# Patient Record
Sex: FEMALE | Race: OTHER | Employment: UNEMPLOYED | URBAN - METROPOLITAN AREA
[De-identification: names, ages, dates, MRNs, and addresses within clinical notes are randomized per-mention and may not be internally consistent; named-entity substitution may affect disease eponyms.]

---

## 2017-07-12 ENCOUNTER — OFFICE VISIT (OUTPATIENT)
Dept: FAMILY MEDICINE CLINIC | Age: 22
End: 2017-07-12

## 2017-07-12 VITALS
OXYGEN SATURATION: 100 % | SYSTOLIC BLOOD PRESSURE: 120 MMHG | HEART RATE: 77 BPM | HEIGHT: 66 IN | RESPIRATION RATE: 16 BRPM | DIASTOLIC BLOOD PRESSURE: 80 MMHG | BODY MASS INDEX: 21.86 KG/M2 | WEIGHT: 136 LBS | TEMPERATURE: 98.2 F

## 2017-07-12 DIAGNOSIS — M54.9 ACUTE BACK PAIN, UNSPECIFIED BACK LOCATION, UNSPECIFIED BACK PAIN LATERALITY: Primary | ICD-10-CM

## 2017-07-12 NOTE — PROGRESS NOTES
HISTORY OF PRESENT ILLNESS  Barber Virk is a 25 y.o. female. Tailbone Pain   The history is provided by the patient. This is a new problem. Episode onset: New patient with tailbone pain. She had a fall down stairs in early June. She was seen in urgent care and was told she had a fracture. Since then, she continues to have episodic pain which has improved. Aggravated by working out. Relieved by rest. The problem occurs daily. The problem has been gradually improving. Review of Systems   Constitutional: Negative. Physical Exam   Constitutional: She appears well-developed and well-nourished. HENT:   Head: Normocephalic and atraumatic. Musculoskeletal: Normal range of motion. She exhibits tenderness. She exhibits no edema or deformity. ASSESSMENT and PLAN  Tailbone fracture: Based on symptoms, she is healing appropriately. Continue to progress activities. Can use NSAIDs as needed. F/u for new or worsening symptoms.

## 2017-07-12 NOTE — PROGRESS NOTES
Chief Complaint   Patient presents with    Tailbone Pain     \"pt stated that she fall down some stairs Bernice 3 2017\"     1. When and where did you last receive medical care? Yes Where: [facility]    2. When and where did you last have preventive care such as mammogram, pap smears or colon screening? no    3. What is your current living situation (for example, live alone, live in home with immediate family members)? Boyfriend mom    4. Do you have any problems with communication such trouble seeing, hearing, or understanding instructions? No    5. Do you have an advance directive? This is a document that you can give to family members with instructions for how you would want them to make health care decisions for you if you were unable to speak for yourself. (For example, unconscious, delerious)No    PMH/FH/Social Hx reviewed and updated as needed     Applicable screenings reviewed and updated as needed  Medication reconciliation performed. Patient does not need medication refills. Health Maintenance reviewed.

## 2017-07-12 NOTE — MR AVS SNAPSHOT
Visit Information Date & Time Provider Department Dept. Phone Encounter #  
 7/12/2017 10:30 AM Trudy Jensen MD [address] [place] [address] [id number] Upcoming Health Maintenance Date Due  
 HPV AGE 9Y-34Y (1 of 3 - Female 3 Dose Series) 7/4/2007 DTaP/Tdap/Td series (1 - Tdap) 7/4/2017 PAP AKA CERVICAL CYTOLOGY 7/4/2017 INFLUENZA AGE 9 TO ADULT 8/1/2017 Allergies as of 7/12/2017  Review Complete On: 7/12/2017 By: Anne Bernal No Known Allergies Current Immunizations  Never Reviewed No immunizations on file. Not reviewed this visit Vitals BP Pulse Temp Resp Height(growth percentile) Weight(growth percentile) 120/80 (BP 1 Location: Left arm, BP Patient Position: Sitting) 77 98.2 °F (36.8 °C) (Oral) 16 5' 6\" (1.676 m) 136 lb (61.7 kg) LMP SpO2 BMI Smoking Status 06/28/2017 (Approximate) 100% 21.95 kg/m2 Never Smoker Vitals History BMI and BSA Data Body Mass Index Body Surface Area  
 21.95 kg/m 2 1.7 m 2 Preferred Pharmacy Pharmacy Name Phone [facility] [address] 39 Your Updated Medication List  
  
Notice  As of 7/12/2017 11:38 AM  
 You have not been prescribed any medications. Introducing [facility] & HEALTH SERVICES! Lenora Aguilar introduces PerBlue patient portal. Now you can access parts of your medical record, email your doctor's office, and request medication refills online. 1. In your internet browser, go to [email or web address] 2. Click on the First Time User? Click Here link in the Sign In box. You will see the New Member Sign Up page. 3. Enter your PerBlue Access Code exactly as it appears below. You will not need to use this code after youve completed the sign-up process. If you do not sign up before the expiration date, you must request a new code.  
 
· PerBlue Access Code: [email or web address] 
 Expires: 10/10/2017 11:38 AM 
 
4. Enter the last four digits of your Social Security Number (xxxx) and Date of Birth (mm/dd/yyyy) as indicated and click Submit. You will be taken to the next sign-up page. 5. Create a Wisembly ID. This will be your Wisembly login ID and cannot be changed, so think of one that is secure and easy to remember. 6. Create a Wisembly password. You can change your password at any time. 7. Enter your Password Reset Question and Answer. This can be used at a later time if you forget your password. 8. Enter your e-mail address. You will receive e-mail notification when new information is available in [address] 9. Click Sign Up. You can now view and download portions of your medical record. 10. Click the Download Summary menu link to download a portable copy of your medical information. If you have questions, please visit the Frequently Asked Questions section of the Wisembly website. Remember, Wisembly is NOT to be used for urgent needs. For medical emergencies, dial 911. Now available from your iPhone and Android! Please provide this summary of care documentation to your next provider. If you have any questions after today's visit, please call [phone].

## 2017-08-22 ENCOUNTER — OFFICE VISIT (OUTPATIENT)
Dept: FAMILY MEDICINE CLINIC | Age: 22
End: 2017-08-22

## 2017-08-22 VITALS
SYSTOLIC BLOOD PRESSURE: 130 MMHG | OXYGEN SATURATION: 100 % | HEART RATE: 65 BPM | RESPIRATION RATE: 16 BRPM | HEIGHT: 66 IN | DIASTOLIC BLOOD PRESSURE: 86 MMHG | WEIGHT: 135 LBS | BODY MASS INDEX: 21.69 KG/M2 | TEMPERATURE: 98.6 F

## 2017-08-22 DIAGNOSIS — M53.3 COCCYX PAIN: Primary | ICD-10-CM

## 2017-08-22 NOTE — PROGRESS NOTES
HISTORY OF PRESENT ILLNESS  Nehemias Newman is a 25 y.o. female. Tailbone Pain   The history is provided by the patient. This is a chronic problem. Episode onset: Presents for f/u of tailbone pain. She had a fall down stairs in early June. She was seen at [facility] and told she had a fx. Her symptoms were slowly resolving when she was seen in July. However, her symptoms have worsened recently. Episode frequency: No new fall. No new symptoms only exacerbation of existing symptoms following exercise. The problem has been gradually worsening. Exacerbated by: Exercise. Pain localizes to coccyx and raddiates over buttocks. Nothing relieves the symptoms. She has tried nothing for the symptoms. Review of Systems   Constitutional: Negative. Physical Exam   Constitutional: She appears well-developed and well-nourished. Genitourinary:   Genitourinary Comments: Exam deferred due to patient discomfort       ASSESSMENT and PLAN  Coccyx pain: Recheck x-ray with further evaluation/treatment based on that result.

## 2017-08-22 NOTE — PROGRESS NOTES
Discharge instructions reviewed with patient. Patient directed to complete x-ray. APA information reviewed and given to patient. Barriers to adherence assessed. Well Woman  Exam scheduled. AVS printed, reviewed and given to patient.

## 2017-08-22 NOTE — MR AVS SNAPSHOT
Visit Information Date & Time Provider Department Dept. Phone Encounter #  
 8/22/2017 11:00 AM Prerna Pierson MD [facility] [address] [id number] Your Appointments 9/12/2017  2:45 PM  
WELL WOMAN EXAM with Prerna Pierson MD  
[facility] [address] ([address]) Appt Note: APPT : Well Woman Exam  
 [phone] Upcoming Health Maintenance Date Due  
 HPV AGE 9Y-34Y (1 of 3 - Female 3 Dose Series) 7/4/2007 DTaP/Tdap/Td series (1 - Tdap) 7/4/2017 PAP AKA CERVICAL CYTOLOGY 7/4/2017 INFLUENZA AGE 9 TO ADULT 8/1/2017 Allergies as of 8/22/2017  Review Complete On: 8/22/2017 By: Prerna Pierson MD  
 No Known Allergies Current Immunizations  Never Reviewed No immunizations on file. Not reviewed this visit You Were Diagnosed With   
  
 Codes Comments Coccyx pain    -  Primary ICD-10-CM: M53.3 ICD-9-CM: 724.79 Vitals BP Pulse Temp Resp Height(growth percentile) Weight(growth percentile) 130/86 (BP 1 Location: Left arm, BP Patient Position: Sitting) 65 98.6 °F (37 °C) (Oral) 16 5' 6\" (1.676 m) 135 lb (61.2 kg) LMP SpO2 BMI OB Status Smoking Status 08/22/2017 (Exact Date) 100% 21.79 kg/m2 Having regular periods Never Smoker Vitals History BMI and BSA Data Body Mass Index Body Surface Area 21.79 kg/m 2 1.69 m 2 Preferred Pharmacy Pharmacy Name Phone [facility] [address] [place] [address] 39 Your Updated Medication List  
  
Notice  As of 8/22/2017 11:40 AM  
 You have not been prescribed any medications. To-Do List   
 08/23/2017 Imaging:  XR SACRUM AND COCCYX Patient Instructions Report for  X-ray to any [address]. Gertrudis Marcial .Call Advanced Patient Advocacy at [phone].  They will screen you for any insurance you might qualify for. This is the first step before you can receive discounts at the hospital or [facility] specialists. Additional contact numbers for APA are below: For [place] patients: [phone] For [place] patients: [phone] For [place]/[facility] patients: [phone] Introducing [facility] & HEALTH SERVICES! [facility] introduces WittyParrot patient portal. Now you can access parts of your medical record, email your doctor's office, and request medication refills online. 1. In your internet browser, go to [email or web address] 2. Click on the First Time User? Click Here link in the Sign In box. You will see the New Member Sign Up page. 3. Enter your WittyParrot Access Code exactly as it appears below. You will not need to use this code after youve completed the sign-up process. If you do not sign up before the expiration date, you must request a new code. · WittyParrot Access Code: [email or web address] Expires: 10/10/2017 11:38 AM 
 
4. Enter the last four digits of your Social Security Number (xxxx) and Date of Birth (mm/dd/yyyy) as indicated and click Submit. You will be taken to the next sign-up page. 5. Create a WittyParrot ID. This will be your WittyParrot login ID and cannot be changed, so think of one that is secure and easy to remember. 6. Create a WittyParrot password. You can change your password at any time. 7. Enter your Password Reset Question and Answer. This can be used at a later time if you forget your password. 8. Enter your e-mail address. You will receive e-mail notification when new information is available in [address] 9. Click Sign Up. You can now view and download portions of your medical record. 10. Click the Download Summary menu link to download a portable copy of your medical information.  
 
If you have questions, please visit the Frequently Asked Questions section of the Localler. Remember, illuminate Solutionshart is NOT to be used for urgent needs. For medical emergencies, dial 911. Now available from your iPhone and Android! Please provide this summary of care documentation to your next provider. If you have any questions after today's visit, please call [phone].

## 2017-08-22 NOTE — PATIENT INSTRUCTIONS
Report for  X-ray to any [facility] [facility] [address]. Eura Benjamin .Call Advanced Patient Advocacy at [phone]. They will screen you for any insurance you might qualify for. This is the first step before you can receive discounts at the hospital or [facility] specialists. Additional contact numbers for APA are below:   For [place]/[facility] patients: [phone]  For [place]/[facility] patients: [phone]  For [place]/[facility] patients: [phone]

## 2017-09-12 ENCOUNTER — HOSPITAL ENCOUNTER (OUTPATIENT)
Dept: LAB | Age: 22
Discharge: HOME | End: 2017-09-12

## 2017-09-12 ENCOUNTER — OFFICE VISIT (OUTPATIENT)
Dept: FAMILY MEDICINE CLINIC | Age: 22
End: 2017-09-12

## 2017-09-12 VITALS
OXYGEN SATURATION: 100 % | RESPIRATION RATE: 16 BRPM | SYSTOLIC BLOOD PRESSURE: 118 MMHG | DIASTOLIC BLOOD PRESSURE: 79 MMHG | WEIGHT: 132 LBS | BODY MASS INDEX: 21.21 KG/M2 | HEIGHT: 66 IN | TEMPERATURE: 98.5 F | HEART RATE: 74 BPM

## 2017-09-12 DIAGNOSIS — Z12.4 SCREENING FOR CERVICAL CANCER: Primary | ICD-10-CM

## 2017-09-12 PROCEDURE — 88142 CYTOPATH C/V THIN LAYER: CPT | Performed by: FAMILY MEDICINE

## 2017-09-12 NOTE — MR AVS SNAPSHOT
Visit Information Date & Time Provider Department Dept. Phone Encounter #  
 9/12/2017  2:45 PM Carey Dietrich MD [facility] [address] [id number] Your Appointments 9/12/2017  2:45 PM  
WELL WOMAN EXAM with Carey Dietrich MD  
[facility] [address]) Appt Note: APPT : Well Woman Exam  
 [address] [facility]  
  
   
 [address] [phone] Upcoming Health Maintenance Date Due  
 HPV AGE 9Y-34Y (1 of 3 - Female 3 Dose Series) 7/4/2007 DTaP/Tdap/Td series (1 - Tdap) 7/4/2017 PAP AKA CERVICAL CYTOLOGY 7/4/2017 INFLUENZA AGE 9 TO ADULT 8/1/2017 Allergies as of 9/12/2017  Review Complete On: 9/12/2017 By: Carey Dietrich MD  
 No Known Allergies Current Immunizations  Never Reviewed No immunizations on file. Not reviewed this visit You Were Diagnosed With   
  
 Codes Comments Screening for cervical cancer    -  Primary ICD-10-CM: Z12.4 ICD-9-CM: V76.2 Vitals BP Pulse Temp Resp Height(growth percentile) Weight(growth percentile) 118/79 (BP 1 Location: Left arm, BP Patient Position: Sitting) 74 98.5 °F (36.9 °C) (Oral) 16 5' 6\" (1.676 m) 132 lb (59.9 kg) LMP SpO2 BMI OB Status Smoking Status 08/22/2017 (Exact Date) 100% 21.31 kg/m2 Having regular periods Never Smoker BMI and BSA Data Body Mass Index Body Surface Area  
 21.31 kg/m 2 1.67 m 2 Preferred Pharmacy Pharmacy Name Phone [facility] [address] 39 Your Updated Medication List  
  
   
This list is accurate as of: 9/12/17  1:41 PM.  Always use your most recent med list.  
  
  
  
  
 PNV 29-1 PO Take  by mouth. To-Do List   
 09/13/2017 Pathology:  PAP, LB, RFX HPV CAEFT([id number]) Introducing \[facility]\"" & HEALTH SERVICES! Taz Prieto introduces FiPath patient portal. Now you can access parts of your medical record, email your doctor's office, and request medication refills online. 1. In your internet browser, go to [email or web address] 2. Click on the First Time User? Click Here link in the Sign In box. You will see the New Member Sign Up page. 3. Enter your FiPath Access Code exactly as it appears below. You will not need to use this code after youve completed the sign-up process. If you do not sign up before the expiration date, you must request a new code. · FiPath Access Code: [email or web address] Expires: 10/10/2017 11:38 AM 
 
4. Enter the last four digits of your Social Security Number (xxxx) and Date of Birth (mm/dd/yyyy) as indicated and click Submit. You will be taken to the next sign-up page. 5. Create a FiPath ID. This will be your FiPath login ID and cannot be changed, so think of one that is secure and easy to remember. 6. Create a FiPath password. You can change your password at any time. 7. Enter your Password Reset Question and Answer. This can be used at a later time if you forget your password. 8. Enter your e-mail address. You will receive e-mail notification when new information is available in [address] 9. Click Sign Up. You can now view and download portions of your medical record. 10. Click the Download Summary menu link to download a portable copy of your medical information. If you have questions, please visit the Frequently Asked Questions section of the FiPath website. Remember, FiPath is NOT to be used for urgent needs. For medical emergencies, dial 911. Now available from your iPhone and Android! Please provide this summary of care documentation to your next provider. Your primary care clinician is listed as NONE. If you have any questions after today's visit, please call [phone].

## 2017-09-12 NOTE — PROGRESS NOTES
HISTORY OF PRESENT ILLNESS  Kylie Mann is a 25 y.o. female. Well Woman   The history is provided by the patient. This is a new problem. Episode onset: 26 yo  presenting for initial pap smear. LMP 17. Menstrual periods have been regular. Only pregnancy was  and child  at birth. No other concerns at this time. The problem occurs constantly. The problem has not changed since onset. Review of Systems   Constitutional: Negative. Physical Exam   Constitutional: She appears well-developed and well-nourished. HENT:   Head: Normocephalic and atraumatic. Cardiovascular: Normal rate, regular rhythm and normal heart sounds. Pulmonary/Chest: Effort normal and breath sounds normal.   Genitourinary: Vagina normal and uterus normal.   Genitourinary Comments: Pap completed       ASSESSMENT and PLAN  Screening for cervical cancer: Pap completed. Await results.

## 2017-09-12 NOTE — PROGRESS NOTES
No chief complaint on file. 1. Have you been to the ER, urgent care clinic since your last visit? Hospitalized since your last visit? No    2. Have you seen or consulted any other health care providers outside of the [address] [place] [address] since your last visit? No    3. When was your last Pap smear? Today   When was your last Mammogram?  N/A  When was your last Colon screening? N/A    PMH/FH/Social Hx reviewed and updated as needed      Applicable screenings reviewed and updated as needed  Medication reconciliation performed. Patient does not know need medication refills. Health Maintenance reviewed.

## 2017-09-12 NOTE — PROGRESS NOTES
Discharge instructions reviewed with patient    Medication list and understanding of medications reviewed with patient. OTC and herbal medications reviewed and added to med list if applicable  Barriers to adherence assessed. Guidance given regarding new medications this visit, including reason for taking this medicine, and common side effects.     AVS given to pt

## 2023-01-04 ENCOUNTER — OFFICE VISIT (OUTPATIENT)
Dept: FAMILY MEDICINE CLINIC | Facility: CLINIC | Age: 28
End: 2023-01-04

## 2023-01-04 VITALS
RESPIRATION RATE: 16 BRPM | HEIGHT: 66 IN | DIASTOLIC BLOOD PRESSURE: 87 MMHG | HEART RATE: 77 BPM | TEMPERATURE: 97.5 F | BODY MASS INDEX: 21.31 KG/M2 | SYSTOLIC BLOOD PRESSURE: 132 MMHG | OXYGEN SATURATION: 98 %

## 2023-01-04 DIAGNOSIS — H61.22 IMPACTED CERUMEN OF LEFT EAR: ICD-10-CM

## 2023-01-04 DIAGNOSIS — J06.9 VIRAL UPPER RESPIRATORY TRACT INFECTION: Primary | ICD-10-CM

## 2023-01-04 PROCEDURE — 99213 OFFICE O/P EST LOW 20 MIN: CPT | Performed by: CLINICAL NURSE SPECIALIST

## 2023-01-04 NOTE — PROGRESS NOTES
HPI  Antonio Edwards is a 33 y.o. female being seen today for   Chief Complaint   Patient presents with    Cold Symptoms   States that she is doing okay. Three days ago she started to have a headache, sore throat, cough, and runny nose. The cough was producing thick mucus. Denies any fever, chills, nausea, vomiting, diarrhea, CP, or SOB. Significant Hicant other was recently sick as well, but he is better now. Feels that she started to turn the corner yesterday, still has mild congestion but feeling much better. Did a home COVID test, it was negative. Has been taking robitussin as well as using the netipot as needed. History reviewed. No pertinent past medical history. ROS  Denies any fever, chills, chest pain, shortness of breath, nausea, vomiting, or diarrhea. No current outpatient medications on file. No current facility-administered medications for this visit. PE  Visit Vitals  /87 (BP 1 Location: Left arm, BP Patient Position: Sitting, BP Cuff Size: Adult)   Pulse 77   Temp 97.5 °F (36.4 °C) (Temporal)   Resp 16   Ht 5' 6\" (1.676 m)   LMP 12/28/2022   SpO2 98%   BMI 21.31 kg/m²       Alert and oriented with appropriate mood. RT TM dull, LT TM obstructed by impacted cerumen. Oral mucosa moist, neck supple, shotty adenopathy. Heart with regular rate and rhythm. Lungs CTA throughout. Assessment and Plan:    Reassuring that symptoms improving. Advised in OTC symptom management as well as debrox. Hydration and rest encouraged. Advised to call if symptoms worsen or do not improve. ICD-10-CM ICD-9-CM    1. Viral upper respiratory tract infection  J06.9 465.9       2.  Impacted cerumen of left ear  H61.22 380.4               Licha Lau NP

## 2023-11-22 ENCOUNTER — OFFICE VISIT (OUTPATIENT)
Age: 28
End: 2023-11-22

## 2023-11-22 ENCOUNTER — HOSPITAL ENCOUNTER (OUTPATIENT)
Facility: HOSPITAL | Age: 28
Setting detail: SPECIMEN
Discharge: HOME | End: 2023-11-25

## 2023-11-22 VITALS
HEART RATE: 64 BPM | WEIGHT: 146 LBS | OXYGEN SATURATION: 100 % | BODY MASS INDEX: 23.46 KG/M2 | DIASTOLIC BLOOD PRESSURE: 96 MMHG | TEMPERATURE: 98.7 F | HEIGHT: 66 IN | SYSTOLIC BLOOD PRESSURE: 140 MMHG | RESPIRATION RATE: 16 BRPM

## 2023-11-22 DIAGNOSIS — Z12.4 SCREENING FOR CERVICAL CANCER: ICD-10-CM

## 2023-11-22 DIAGNOSIS — R10.9 ABDOMINAL PAIN, UNSPECIFIED ABDOMINAL LOCATION: Primary | ICD-10-CM

## 2023-11-22 DIAGNOSIS — R10.9 ABDOMINAL PAIN, UNSPECIFIED ABDOMINAL LOCATION: ICD-10-CM

## 2023-11-22 LAB
BILIRUBIN, URINE, POC: NEGATIVE
BLOOD URINE, POC: NEGATIVE
GLUCOSE URINE, POC: NEGATIVE
KETONES, URINE, POC: NEGATIVE
LEUKOCYTE ESTERASE, URINE, POC: NEGATIVE
NITRITE, URINE, POC: NEGATIVE
PH, URINE, POC: 5.5 (ref 4.6–8)
PROTEIN,URINE, POC: 30
SPECIFIC GRAVITY, URINE, POC: 1.03 (ref 1–1.03)
URINALYSIS CLARITY, POC: CLEAR
URINALYSIS COLOR, POC: NORMAL
UROBILINOGEN, POC: NORMAL

## 2023-11-22 PROCEDURE — 87086 URINE CULTURE/COLONY COUNT: CPT

## 2023-11-22 PROCEDURE — 87591 N.GONORRHOEAE DNA AMP PROB: CPT

## 2023-11-22 PROCEDURE — 87801 DETECT AGNT MULT DNA AMPLI: CPT

## 2023-11-22 PROCEDURE — 87624 HPV HI-RISK TYP POOLED RSLT: CPT

## 2023-11-22 PROCEDURE — 88142 CYTOPATH C/V THIN LAYER: CPT

## 2023-11-22 PROCEDURE — 87798 DETECT AGENT NOS DNA AMP: CPT

## 2023-11-22 PROCEDURE — 87491 CHLMYD TRACH DNA AMP PROBE: CPT

## 2023-11-22 PROCEDURE — 87661 TRICHOMONAS VAGINALIS AMPLIF: CPT

## 2023-11-23 LAB
BACTERIA SPEC CULT: NORMAL
SERVICE CMNT-IMP: NORMAL

## 2023-11-24 LAB
C TRACH RRNA SPEC QL NAA+PROBE: NEGATIVE
N GONORRHOEA RRNA SPEC QL NAA+PROBE: NEGATIVE
SPECIMEN SOURCE: NORMAL

## 2023-11-27 ENCOUNTER — TELEPHONE (OUTPATIENT)
Age: 28
End: 2023-11-27

## 2023-11-27 LAB
BV DNA PNL VAG NAA+PROBE: NEGATIVE
C GLABRATA DNA VAG QL NAA+PROBE: NEGATIVE
CANDIDA DNA VAG QL NAA+PROBE: NEGATIVE
T VAGINALIS RRNA SPEC QL NAA+PROBE: NEGATIVE

## 2024-02-06 ENCOUNTER — OFFICE VISIT (OUTPATIENT)
Age: 29
End: 2024-02-06

## 2024-02-06 VITALS
DIASTOLIC BLOOD PRESSURE: 83 MMHG | BODY MASS INDEX: 24.59 KG/M2 | TEMPERATURE: 97.3 F | WEIGHT: 153 LBS | RESPIRATION RATE: 14 BRPM | SYSTOLIC BLOOD PRESSURE: 139 MMHG | HEIGHT: 66 IN | OXYGEN SATURATION: 100 % | HEART RATE: 84 BPM

## 2024-02-06 DIAGNOSIS — S86.912A KNEE STRAIN, LEFT, INITIAL ENCOUNTER: Primary | ICD-10-CM

## 2024-02-06 PROCEDURE — 99213 OFFICE O/P EST LOW 20 MIN: CPT | Performed by: NURSE PRACTITIONER

## 2024-02-06 RX ORDER — NAPROXEN 500 MG/1
500 TABLET ORAL 2 TIMES DAILY WITH MEALS
Qty: 28 TABLET | Refills: 0 | Status: SHIPPED | OUTPATIENT
Start: 2024-02-06

## 2024-02-06 ASSESSMENT — PATIENT HEALTH QUESTIONNAIRE - PHQ9
SUM OF ALL RESPONSES TO PHQ QUESTIONS 1-9: 0
SUM OF ALL RESPONSES TO PHQ9 QUESTIONS 1 & 2: 0
SUM OF ALL RESPONSES TO PHQ QUESTIONS 1-9: 0
1. LITTLE INTEREST OR PLEASURE IN DOING THINGS: 0
2. FEELING DOWN, DEPRESSED OR HOPELESS: 0
SUM OF ALL RESPONSES TO PHQ QUESTIONS 1-9: 0
SUM OF ALL RESPONSES TO PHQ QUESTIONS 1-9: 0

## 2024-02-06 NOTE — PROGRESS NOTES
[facility]  Carmela Wang is a 28 y.o. female being seen today for   Chief Complaint   Patient presents with    Knee Injury     Pt stated 2 weeks ago, she was walking down the hill and she thinks she stepped down wrong      About two weeks ago was at [place]. Was climbing down a hill when she felt \"a pop\" in her left knee. Did not fall. Knee did not buckle. Later noticed some discomfort and swelling.  She has not taken any pain meds. Has not used and ice or heat.  Continues to have pain with climbing stairs.   Previous injury to the same knee back in 2016 when she slipped in the bathroom and hit her knee on a corner wall.    History reviewed. No pertinent past medical history.      ROS  Patient states that she is feeling well. Denies complaints of chest pain, shortness of breath, swelling of legs, dizziness or weakness. she denies nausea, vomiting or diarrhea.        No current outpatient medications on file.     No current facility-administered medications for this visit.       PE  /83 (Site: Right Upper Arm, Position: Sitting, Cuff Size: Medium Adult)   Pulse 84   Temp 97.3 °F (36.3 °C) (Temporal)   Resp 14   Ht 1.676 m (5' 6\")   Wt 69.4 kg (153 lb)   LMP 01/25/2024 (Approximate)   SpO2 100%   BMI 24.69 kg/m²      Alert and oriented with normal mood and affect. she is well developed and well nourished . Lungs are clear without wheezing. Heart rate is regular without murmurs or gallops. There is no lower extremity edema.   Normal gait and stance. Full ROM of all extremities. There is no obvious joint swelling or laxity.    No results found for this visit on 02/06/24.      Assessment and Plan:       Diagnosis Orders   1. Knee strain, left, initial encounter        Discussed likely strain vs torn ligament however cannot completely rule out without imaging.  NSAID x 2 weeks  Will get knee brace for support. Ice/heat applications may also be helpful. Avoid aggravating movements but also stay

## 2024-02-07 NOTE — PROGRESS NOTES
I have reviewed the attatched progress note and I agree with the plan and medical decision making as outlined by MAGGIE Razo MD

## 2024-05-14 ENCOUNTER — OFFICE VISIT (OUTPATIENT)
Age: 29
End: 2024-05-14

## 2024-05-14 VITALS
DIASTOLIC BLOOD PRESSURE: 96 MMHG | HEIGHT: 66 IN | TEMPERATURE: 97.8 F | WEIGHT: 155 LBS | SYSTOLIC BLOOD PRESSURE: 125 MMHG | BODY MASS INDEX: 24.91 KG/M2 | RESPIRATION RATE: 16 BRPM | HEART RATE: 76 BPM | OXYGEN SATURATION: 99 %

## 2024-05-14 DIAGNOSIS — R03.0 ELEVATED BLOOD PRESSURE READING: ICD-10-CM

## 2024-05-14 DIAGNOSIS — H60.502 ACUTE OTITIS EXTERNA OF LEFT EAR, UNSPECIFIED TYPE: Primary | ICD-10-CM

## 2024-05-14 PROCEDURE — 99213 OFFICE O/P EST LOW 20 MIN: CPT | Performed by: NURSE PRACTITIONER

## 2024-05-14 NOTE — PROGRESS NOTES
[facility]  Carmela Wang is a 28 y.o. female being seen today for   Chief Complaint   Patient presents with    Ear Drainage     Bilateral., Brown drainage. Patient stated the draining has been going for months now and has mild pain in the left ear     Patient is here with c/o ear drainage, off and on x several months. She had used debrox to remove wax and noticed the symptoms soon after. Left worse than right. Itchy. Brownish discharge sometimes present.  History of ear infections as a child. None recent. Never had tubes. Denies issues with seasonal allergies.  She is otherwise feeling well.    No past medical history on file.      ROS  Patient states that she is feeling well. Denies complaints of chest pain, shortness of breath, swelling of legs, dizziness or weakness. she denies nausea, vomiting or diarrhea.        Current Outpatient Medications   Medication Sig    neomycin-polymyxin-hydrocortisone (CORTISPORIN) [id number] otic solution Place 4 drops into the left ear 3 times daily for 10 days Instill into left Ear     No current facility-administered medications for this visit.       PE  BP (!) 125/96 (Site: Left Upper Arm, Position: Sitting, Cuff Size: Medium Adult)   Pulse 76   Temp 97.8 °F (36.6 °C) (Temporal)   Resp 16   Ht 1.676 m (5' 6\")   Wt 70.3 kg (155 lb)   LMP 05/13/2024 (Approximate)   SpO2 99%   BMI 25.02 kg/m²      Alert and oriented with normal mood and affect. she is well developed and well nourished . Lungs are clear without wheezing. Heart rate is regular without murmurs or gallops. There is no lower extremity edema.   Left ear with erythema and mild edema to canal. TM normal.    No results found for this visit on 05/14/24.      Assessment and Plan:       Diagnosis Orders   1. Acute otitis externa of left ear, unspecified type        2. Elevated blood pressure reading          Rx for cortisporin otic sent to patient pharmacy. Avoid putting anything else in ear canal. Avoid swimming for a

## 2024-05-28 ENCOUNTER — HOSPITAL ENCOUNTER (OUTPATIENT)
Facility: HOSPITAL | Age: 29
Setting detail: SPECIMEN
Discharge: HOME | End: 2024-05-31

## 2024-05-28 ENCOUNTER — OFFICE VISIT (OUTPATIENT)
Age: 29
End: 2024-05-28

## 2024-05-28 VITALS
WEIGHT: 154 LBS | RESPIRATION RATE: 16 BRPM | OXYGEN SATURATION: 98 % | SYSTOLIC BLOOD PRESSURE: 125 MMHG | HEIGHT: 66 IN | HEART RATE: 101 BPM | DIASTOLIC BLOOD PRESSURE: 86 MMHG | TEMPERATURE: 97.9 F | BODY MASS INDEX: 24.75 KG/M2

## 2024-05-28 DIAGNOSIS — R03.0 ELEVATED BLOOD PRESSURE READING: Primary | ICD-10-CM

## 2024-05-28 DIAGNOSIS — R03.0 ELEVATED BLOOD PRESSURE READING: ICD-10-CM

## 2024-05-28 LAB
ALBUMIN SERPL-MCNC: 4 G/DL (ref 3.4–5)
ALBUMIN/GLOB SERPL: 1.1 (ref 0.8–1.7)
ALP SERPL-CCNC: 68 U/L (ref 45–117)
ALT SERPL-CCNC: 19 U/L (ref 13–56)
ANION GAP SERPL CALC-SCNC: 5 MMOL/L (ref 3–18)
AST SERPL-CCNC: 20 U/L (ref 10–38)
BILIRUB SERPL-MCNC: 0.4 MG/DL (ref 0.2–1)
BILIRUBIN, URINE, POC: NEGATIVE
BLOOD URINE, POC: NEGATIVE
BUN SERPL-MCNC: 16 MG/DL (ref 7–18)
BUN/CREAT SERPL: 19 (ref 12–20)
CALCIUM SERPL-MCNC: 9.4 MG/DL (ref 8.5–10.1)
CHLORIDE SERPL-SCNC: 104 MMOL/L (ref 100–111)
CHOLEST SERPL-MCNC: 163 MG/DL
CO2 SERPL-SCNC: 26 MMOL/L (ref 21–32)
CREAT SERPL-MCNC: 0.84 MG/DL (ref 0.6–1.3)
GLOBULIN SER CALC-MCNC: 3.8 G/DL (ref 2–4)
GLUCOSE SERPL-MCNC: 94 MG/DL (ref 74–99)
GLUCOSE URINE, POC: NEGATIVE
HDLC SERPL-MCNC: 53 MG/DL (ref 40–60)
HDLC SERPL: 3.1 (ref 0–5)
KETONES, URINE, POC: NEGATIVE
LDLC SERPL CALC-MCNC: 84.6 MG/DL (ref 0–100)
LEUKOCYTE ESTERASE, URINE, POC: NEGATIVE
LIPID PANEL: NORMAL
NITRITE, URINE, POC: NEGATIVE
PH, URINE, POC: 6 (ref 4.6–8)
POTASSIUM SERPL-SCNC: 4.3 MMOL/L (ref 3.5–5.5)
PROT SERPL-MCNC: 7.8 G/DL (ref 6.4–8.2)
PROTEIN,URINE, POC: NORMAL
SODIUM SERPL-SCNC: 135 MMOL/L (ref 136–145)
SPECIFIC GRAVITY, URINE, POC: 1.03 (ref 1–1.03)
TRIGL SERPL-MCNC: 127 MG/DL
URINALYSIS CLARITY, POC: CLEAR
URINALYSIS COLOR, POC: NORMAL
UROBILINOGEN, POC: NORMAL
VLDLC SERPL CALC-MCNC: 25.4 MG/DL

## 2024-05-28 PROCEDURE — 99213 OFFICE O/P EST LOW 20 MIN: CPT | Performed by: NURSE PRACTITIONER

## 2024-05-28 PROCEDURE — 80061 LIPID PANEL: CPT

## 2024-05-28 PROCEDURE — 81003 URINALYSIS AUTO W/O SCOPE: CPT | Performed by: NURSE PRACTITIONER

## 2024-05-28 PROCEDURE — 80053 COMPREHEN METABOLIC PANEL: CPT

## 2024-05-28 NOTE — PROGRESS NOTES
Blood pressure cuff and blood pressure log given to the patient    Discharge instructions reviewed with patient    Medication list and understanding of medications reviewed with patient.   OTC and herbal medications reviewed and added to med list if applicable  Barriers to adherence assessed.    Guidance given regarding new medications this visit, including reason for taking this medicine, and common side effects.     AVS given to patient. Explained to patient. Patient expressed understanding.

## 2024-05-28 NOTE — PROGRESS NOTES
ANDREIA  Carmela Wang is a 28 y.o. female being seen today for   Chief Complaint   Patient presents with    Hypertension     Patient is here for follow up blood pressure. Has had several elevated readings when she comes to [facility]. She does not smoke. Denies alcohol or other drug use. Very minimal caffeine intake. Does not eat a lot of salty foods. No prescription or OTC meds.    She states she thinks she has white coat hypertension. When she was about 14 years old she had some high blood pressure readings and her pediatrician did \"blood work and some other tests\" that she reports were all normal.    Family history is positive for father with hypertension.    No past medical history on file.      ROS  Patient states that she is feeling well. Denies complaints of chest pain, shortness of breath, swelling of legs, dizziness or weakness. she denies nausea, vomiting or diarrhea.        No current outpatient medications on file.     No current facility-administered medications for this visit.       PE  /86 (Site: Left Upper Arm, Position: Sitting, Cuff Size: Medium Adult)   Pulse (!) 101   Temp 97.9 °F (36.6 °C) (Temporal)   Resp 16   Ht 1.676 m (5' 6\")   Wt 69.9 kg (154 lb)   LMP 05/13/2024 (Approximate)   SpO2 98%   BMI 24.86 kg/m²      Alert and oriented with normal mood and affect. she is well developed and well nourished . Lungs are clear without wheezing. Heart rate is regular without murmurs or gallops. There is no lower extremity edema.     Repeat /88, left arm, seated. Pulse 92.    Results for orders placed or performed in visit on 05/28/24   AMB POC URINALYSIS DIP STICK AUTO W/O MICRO   Result Value Ref Range    Color, Urine, POC Light Yellow     Clarity, Urine, POC Clear     Glucose, Urine, POC Negative     Bilirubin, Urine, POC Negative     Ketones, Urine, POC Negative     Specific Gravity, Urine, POC 1.030 1.001 - 1.035    Blood, Urine, POC Negative     pH, Urine, POC 6.0 4.6 - 8.0